# Patient Record
Sex: MALE | Race: WHITE | Employment: OTHER | URBAN - METROPOLITAN AREA
[De-identification: names, ages, dates, MRNs, and addresses within clinical notes are randomized per-mention and may not be internally consistent; named-entity substitution may affect disease eponyms.]

---

## 2020-08-31 ENCOUNTER — HOSPITAL ENCOUNTER (OUTPATIENT)
Age: 70
Discharge: HOME | End: 2020-09-02
Payer: COMMERCIAL

## 2020-08-31 PROCEDURE — U0003 INFECTIOUS AGENT DETECTION BY NUCLEIC ACID (DNA OR RNA); SEVERE ACUTE RESPIRATORY SYNDROME CORONAVIRUS 2 (SARS-COV-2) (CORONAVIRUS DISEASE [COVID-19]), AMPLIFIED PROBE TECHNIQUE, MAKING USE OF HIGH THROUGHPUT TECHNOLOGIES AS DESCRIBED BY CMS-2020-01-R: HCPCS

## 2020-09-01 LAB
SARS-COV-2: NOT DETECTED
SOURCE: NORMAL

## 2020-09-04 ENCOUNTER — HOSPITAL ENCOUNTER (OUTPATIENT)
Dept: PULMONOLOGY | Age: 70
Discharge: HOME | End: 2020-09-04
Payer: COMMERCIAL

## 2020-09-04 PROCEDURE — 94060 EVALUATION OF WHEEZING: CPT

## 2020-09-07 NOTE — PROCEDURES
[address] [address]                               PULMONARY FUNCTION    PATIENT NAME: Maximilian Elena                   :        1950  MED REC NO:   [id number]                            ROOM:  ACCOUNT NO:   [de-identified]                           ADMIT DATE: 2020  PROVIDER:     Sarah Root MD    DATE OF PROCEDURE:  2020    ORDERING PROVIDER:  Reyna Putnam MD.    DIAGNOSIS:  Shortness of breath. The patient is reported to smoke one  and a half pack a day for 52 years. FINDINGS:  As follows. FEV1 to FVC was 82. FEV1 was 2.85 liters, which  was 82% of predicted. FVC was 3.5 liters, which was 76% of predicted. IMPRESSION:  1. No obstruction. 2.  Probable mild restrictive pattern. RECOMMENDATIONS:  Recommend clinical correlation. I would recommend to  obtain also complete pulmonary function test including total lung  capacity and diffusion capacity.         Lillie Escobar MD    D: 2020 15:30:07       T: 2020 22:50:34     [id number]  Job#: [id number]     Doc#: [id number]    CC:

## 2020-12-03 DIAGNOSIS — R97.20 ELEVATED PSA: ICD-10-CM

## 2020-12-03 DIAGNOSIS — E78.00 PURE HYPERCHOLESTEROLEMIA: ICD-10-CM

## 2020-12-03 DIAGNOSIS — E11.9 TYPE 2 DIABETES MELLITUS WITHOUT COMPLICATION, WITHOUT LONG-TERM CURRENT USE OF INSULIN (HCC): ICD-10-CM

## 2020-12-03 DIAGNOSIS — E55.9 VITAMIN D INSUFFICIENCY: ICD-10-CM

## 2020-12-03 LAB
ALBUMIN SERPL-MCNC: 4.3 G/DL (ref 3.5–5.2)
ALP BLD-CCNC: 73 U/L (ref 40–129)
ALT SERPL-CCNC: 18 U/L (ref 0–40)
ANION GAP SERPL CALCULATED.3IONS-SCNC: 14 MMOL/L (ref 7–16)
AST SERPL-CCNC: 16 U/L (ref 0–39)
BILIRUB SERPL-MCNC: 0.6 MG/DL (ref 0–1.2)
BUN BLDV-MCNC: 21 MG/DL (ref 8–23)
CALCIUM SERPL-MCNC: 9.7 MG/DL (ref 8.6–10.2)
CHLORIDE BLD-SCNC: 100 MMOL/L (ref 98–107)
CHOLESTEROL, TOTAL: 129 MG/DL (ref 0–199)
CO2: 24 MMOL/L (ref 22–29)
CREAT SERPL-MCNC: 0.8 MG/DL (ref 0.7–1.2)
GFR AFRICAN AMERICAN: >60
GFR NON-AFRICAN AMERICAN: >60 ML/MIN/1.73
GLUCOSE BLD-MCNC: 149 MG/DL (ref 74–99)
HBA1C MFR BLD: 6.6 % (ref 4–5.6)
HCT VFR BLD CALC: 45.3 % (ref 37–54)
HDLC SERPL-MCNC: 42 MG/DL
HEMOGLOBIN: 15 G/DL (ref 12.5–16.5)
LDL CHOLESTEROL CALCULATED: 73 MG/DL (ref 0–99)
MCH RBC QN AUTO: 30.6 PG (ref 26–35)
MCHC RBC AUTO-ENTMCNC: 33.1 % (ref 32–34.5)
MCV RBC AUTO: 92.4 FL (ref 80–99.9)
PDW BLD-RTO: 13.2 FL (ref 11.5–15)
PLATELET # BLD: 290 E9/L (ref 130–450)
PMV BLD AUTO: 10 FL (ref 7–12)
POTASSIUM SERPL-SCNC: 4.5 MMOL/L (ref 3.5–5)
PROSTATE SPECIFIC ANTIGEN: 7.7 NG/ML (ref 0–4)
RBC # BLD: 4.9 E12/L (ref 3.8–5.8)
SODIUM BLD-SCNC: 138 MMOL/L (ref 132–146)
TOTAL PROTEIN: 7.3 G/DL (ref 6.4–8.3)
TRIGL SERPL-MCNC: 72 MG/DL (ref 0–149)
VITAMIN D 25-HYDROXY: 21 NG/ML (ref 30–100)
VLDLC SERPL CALC-MCNC: 14 MG/DL
WBC # BLD: 12.6 E9/L (ref 4.5–11.5)

## 2020-12-23 DIAGNOSIS — Z20.828 EXPOSURE TO SARS-ASSOCIATED CORONAVIRUS: ICD-10-CM

## 2020-12-25 LAB
SARS-COV-2: NOT DETECTED
SOURCE: NORMAL

## 2020-12-28 PROBLEM — M54.50 CHRONIC MIDLINE LOW BACK PAIN WITHOUT SCIATICA: Status: ACTIVE | Noted: 2020-12-28

## 2020-12-28 PROBLEM — G89.29 CHRONIC MIDLINE LOW BACK PAIN WITHOUT SCIATICA: Status: ACTIVE | Noted: 2020-12-28

## 2020-12-30 DIAGNOSIS — Z20.822 EXPOSURE TO COVID-19 VIRUS: ICD-10-CM

## 2021-01-01 ENCOUNTER — TELEPHONE (OUTPATIENT)
Dept: CASE MANAGEMENT | Age: 71
End: 2021-01-01

## 2021-01-01 ENCOUNTER — HOSPITAL ENCOUNTER (OUTPATIENT)
Age: 71
Discharge: HOME | End: 2021-05-01
Payer: MEDICARE

## 2021-01-01 ENCOUNTER — HOSPITAL ENCOUNTER (OUTPATIENT)
Dept: CT IMAGING | Age: 71
Discharge: HOME | End: 2021-10-27
Payer: MEDICARE

## 2021-01-01 ENCOUNTER — HOSPITAL ENCOUNTER (OUTPATIENT)
Age: 71
Setting detail: OUTPATIENT SURGERY
Discharge: HOME | End: 2021-05-04
Attending: UROLOGY | Admitting: UROLOGY
Payer: MEDICARE

## 2021-01-01 ENCOUNTER — ANESTHESIA EVENT (OUTPATIENT)
Dept: OPERATING ROOM | Age: 71
End: 2021-01-01
Payer: MEDICARE

## 2021-01-01 ENCOUNTER — ANESTHESIA (OUTPATIENT)
Dept: OPERATING ROOM | Age: 71
End: 2021-01-01
Payer: MEDICARE

## 2021-01-01 ENCOUNTER — PREP FOR PROCEDURE (OUTPATIENT)
Dept: UROLOGY | Age: 71
End: 2021-01-01

## 2021-01-01 VITALS — DIASTOLIC BLOOD PRESSURE: 59 MMHG | OXYGEN SATURATION: 93 % | SYSTOLIC BLOOD PRESSURE: 102 MMHG

## 2021-01-01 VITALS
BODY MASS INDEX: 26.95 KG/M2 | RESPIRATION RATE: 17 BRPM | OXYGEN SATURATION: 96 % | HEIGHT: 72 IN | HEART RATE: 72 BPM | TEMPERATURE: 96.8 F | SYSTOLIC BLOOD PRESSURE: 133 MMHG | DIASTOLIC BLOOD PRESSURE: 62 MMHG | WEIGHT: 199 LBS

## 2021-01-01 DIAGNOSIS — M54.50 CHRONIC MIDLINE LOW BACK PAIN WITHOUT SCIATICA: ICD-10-CM

## 2021-01-01 DIAGNOSIS — Z01.818 PREOP TESTING: ICD-10-CM

## 2021-01-01 DIAGNOSIS — E11.9 TYPE 2 DIABETES MELLITUS WITHOUT COMPLICATION, WITHOUT LONG-TERM CURRENT USE OF INSULIN (HCC): ICD-10-CM

## 2021-01-01 DIAGNOSIS — R97.20 ELEVATED PSA: ICD-10-CM

## 2021-01-01 DIAGNOSIS — Z01.818 PREOP TESTING: Primary | ICD-10-CM

## 2021-01-01 DIAGNOSIS — J43.8 OTHER EMPHYSEMA (HCC): ICD-10-CM

## 2021-01-01 DIAGNOSIS — E78.2 MIXED HYPERLIPIDEMIA: ICD-10-CM

## 2021-01-01 DIAGNOSIS — E78.00 PURE HYPERCHOLESTEROLEMIA: ICD-10-CM

## 2021-01-01 DIAGNOSIS — E55.9 VITAMIN D INSUFFICIENCY: ICD-10-CM

## 2021-01-01 DIAGNOSIS — G89.29 CHRONIC MIDLINE LOW BACK PAIN WITHOUT SCIATICA: ICD-10-CM

## 2021-01-01 LAB
ALBUMIN SERPL-MCNC: 4.2 G/DL (ref 3.5–5.2)
ALBUMIN SERPL-MCNC: 4.4 G/DL (ref 3.5–5.2)
ALP BLD-CCNC: 65 U/L (ref 40–129)
ALP BLD-CCNC: 78 U/L (ref 40–129)
ALT SERPL-CCNC: 13 U/L (ref 0–40)
ALT SERPL-CCNC: 15 U/L (ref 0–40)
AMPHETAMINE SCREEN, URINE: NOT DETECTED
ANION GAP SERPL CALCULATED.3IONS-SCNC: 15 MMOL/L (ref 7–16)
ANION GAP SERPL CALCULATED.3IONS-SCNC: 19 MMOL/L (ref 7–16)
AST SERPL-CCNC: 15 U/L (ref 0–39)
AST SERPL-CCNC: 18 U/L (ref 0–39)
BARBITURATE SCREEN URINE: NOT DETECTED
BENZODIAZEPINE SCREEN, URINE: NOT DETECTED
BILIRUB SERPL-MCNC: 0.7 MG/DL (ref 0–1.2)
BILIRUB SERPL-MCNC: 0.7 MG/DL (ref 0–1.2)
BILIRUBIN URINE: NEGATIVE
BLOOD, URINE: NEGATIVE
BUN BLDV-MCNC: 13 MG/DL (ref 6–23)
BUN BLDV-MCNC: 18 MG/DL (ref 6–23)
CALCIUM SERPL-MCNC: 9.6 MG/DL (ref 8.6–10.2)
CALCIUM SERPL-MCNC: 9.7 MG/DL (ref 8.6–10.2)
CANNABINOID SCREEN URINE: NOT DETECTED
CHLORIDE BLD-SCNC: 96 MMOL/L (ref 98–107)
CHLORIDE BLD-SCNC: 99 MMOL/L (ref 98–107)
CHOLESTEROL, TOTAL: 145 MG/DL (ref 0–199)
CHOLESTEROL, TOTAL: 217 MG/DL (ref 0–199)
CLARITY: CLEAR
CO2: 25 MMOL/L (ref 22–29)
CO2: 25 MMOL/L (ref 22–29)
COCAINE METABOLITE SCREEN URINE: NOT DETECTED
COLOR: YELLOW
CREAT SERPL-MCNC: 0.7 MG/DL (ref 0.7–1.2)
CREAT SERPL-MCNC: 0.9 MG/DL (ref 0.7–1.2)
CREATININE URINE: 110 MG/DL (ref 40–278)
FENTANYL SCREEN, URINE: NOT DETECTED
GFR AFRICAN AMERICAN: >60
GFR AFRICAN AMERICAN: >60
GFR NON-AFRICAN AMERICAN: >60 ML/MIN/1.73
GFR NON-AFRICAN AMERICAN: >60 ML/MIN/1.73
GLUCOSE BLD-MCNC: 119 MG/DL (ref 74–99)
GLUCOSE BLD-MCNC: 264 MG/DL (ref 74–99)
GLUCOSE URINE: NEGATIVE MG/DL
HBA1C MFR BLD: 6.3 % (ref 4–5.6)
HBA1C MFR BLD: 7.6 % (ref 4–5.6)
HCT VFR BLD CALC: 44 % (ref 37–54)
HDLC SERPL-MCNC: 52 MG/DL
HDLC SERPL-MCNC: 64 MG/DL
HEMOGLOBIN: 14.6 G/DL (ref 12.5–16.5)
KETONES, URINE: NEGATIVE MG/DL
LDL CHOLESTEROL CALCULATED: 134 MG/DL (ref 0–99)
LDL CHOLESTEROL CALCULATED: 74 MG/DL (ref 0–99)
LEUKOCYTE ESTERASE, URINE: NEGATIVE
Lab: ABNORMAL
MCH RBC QN AUTO: 31.8 PG (ref 26–35)
MCHC RBC AUTO-ENTMCNC: 33.2 % (ref 32–34.5)
MCV RBC AUTO: 95.9 FL (ref 80–99.9)
METER GLUCOSE: 132 MG/DL (ref 74–99)
METHADONE SCREEN, URINE: NOT DETECTED
MICROALBUMIN UR-MCNC: <12 MG/L
MICROALBUMIN/CREAT UR-RTO: ABNORMAL (ref 0–30)
NITRITE, URINE: NEGATIVE
OPIATE SCREEN URINE: POSITIVE
OXYCODONE URINE: NOT DETECTED
PDW BLD-RTO: 13.1 FL (ref 11.5–15)
PH UA: 7 (ref 5–9)
PHENCYCLIDINE SCREEN URINE: NOT DETECTED
PLATELET # BLD: 238 E9/L (ref 130–450)
PMV BLD AUTO: 9.4 FL (ref 7–12)
POTASSIUM SERPL-SCNC: 4.6 MMOL/L (ref 3.5–5)
POTASSIUM SERPL-SCNC: 4.6 MMOL/L (ref 3.5–5)
PROSTATE SPECIFIC ANTIGEN FREE: <0.1 NG/ML
PROSTATE SPECIFIC ANTIGEN PERCENT FREE: NORMAL %
PROSTATE SPECIFIC ANTIGEN: 0.08 NG/ML (ref 0–4)
PROSTATE SPECIFIC ANTIGEN: <0.1 NG/ML (ref 0–4)
PROTEIN UA: NEGATIVE MG/DL
RBC # BLD: 4.59 E12/L (ref 3.8–5.8)
SARS-COV-2: NOT DETECTED
SARS-COV-2: NOT DETECTED
SODIUM BLD-SCNC: 136 MMOL/L (ref 132–146)
SODIUM BLD-SCNC: 143 MMOL/L (ref 132–146)
SOURCE: NORMAL
SOURCE: NORMAL
SPECIFIC GRAVITY UA: 1.02 (ref 1–1.03)
TESTOSTERONE TOTAL: <2.5 NG/DL
TOTAL PROTEIN: 7 G/DL (ref 6.4–8.3)
TOTAL PROTEIN: 7.1 G/DL (ref 6.4–8.3)
TRIGL SERPL-MCNC: 94 MG/DL (ref 0–149)
TRIGL SERPL-MCNC: 97 MG/DL (ref 0–149)
UROBILINOGEN, URINE: 1 E.U./DL
VITAMIN D 25-HYDROXY: 26 NG/ML (ref 30–100)
VLDLC SERPL CALC-MCNC: 19 MG/DL
VLDLC SERPL CALC-MCNC: 19 MG/DL
WBC # BLD: 8.8 E9/L (ref 4.5–11.5)

## 2021-01-01 PROCEDURE — 2580000003 HC RX 258: Performed by: NURSE ANESTHETIST, CERTIFIED REGISTERED

## 2021-01-01 PROCEDURE — 7100000010 HC PHASE II RECOVERY - FIRST 15 MIN: Performed by: UROLOGY

## 2021-01-01 PROCEDURE — 7100000011 HC PHASE II RECOVERY - ADDTL 15 MIN: Performed by: UROLOGY

## 2021-01-01 PROCEDURE — U0003 INFECTIOUS AGENT DETECTION BY NUCLEIC ACID (DNA OR RNA); SEVERE ACUTE RESPIRATORY SYNDROME CORONAVIRUS 2 (SARS-COV-2) (CORONAVIRUS DISEASE [COVID-19]), AMPLIFIED PROBE TECHNIQUE, MAKING USE OF HIGH THROUGHPUT TECHNOLOGIES AS DESCRIBED BY CMS-2020-01-R: HCPCS

## 2021-01-01 PROCEDURE — 6360000002 HC RX W HCPCS: Performed by: NURSE PRACTITIONER

## 2021-01-01 PROCEDURE — 2780000010 HC IMPLANT OTHER: Performed by: UROLOGY

## 2021-01-01 PROCEDURE — 3600000012 HC SURGERY LEVEL 2 ADDTL 15MIN: Performed by: UROLOGY

## 2021-01-01 PROCEDURE — C1889 IMPLANT/INSERT DEVICE, NOC: HCPCS | Performed by: UROLOGY

## 2021-01-01 PROCEDURE — 71271 CT THORAX LUNG CANCER SCR C-: CPT

## 2021-01-01 PROCEDURE — 82962 GLUCOSE BLOOD TEST: CPT

## 2021-01-01 PROCEDURE — 3600000002 HC SURGERY LEVEL 2 BASE: Performed by: UROLOGY

## 2021-01-01 PROCEDURE — 3700000001 HC ADD 15 MINUTES (ANESTHESIA): Performed by: UROLOGY

## 2021-01-01 PROCEDURE — 3700000000 HC ANESTHESIA ATTENDED CARE: Performed by: UROLOGY

## 2021-01-01 PROCEDURE — 2500000003 HC RX 250 WO HCPCS: Performed by: UROLOGY

## 2021-01-01 PROCEDURE — 2709999900 HC NON-CHARGEABLE SUPPLY: Performed by: UROLOGY

## 2021-01-01 PROCEDURE — 6360000002 HC RX W HCPCS: Performed by: NURSE ANESTHETIST, CERTIFIED REGISTERED

## 2021-01-01 DEVICE — SPACEOAR SYSTEMS
Type: IMPLANTABLE DEVICE | Status: FUNCTIONAL
Brand: SPACEOAR VUE™ SYSTEM - 10ML

## 2021-01-01 RX ORDER — ALBUTEROL SULFATE 90 UG/1
2 AEROSOL, METERED RESPIRATORY (INHALATION) EVERY 6 HOURS PRN
COMMUNITY

## 2021-01-01 RX ORDER — HYDROCODONE BITARTRATE AND ACETAMINOPHEN 5; 325 MG/1; MG/1
2 TABLET ORAL PRN
Status: DISCONTINUED | OUTPATIENT
Start: 2021-01-01 | End: 2021-01-01 | Stop reason: HOSPADM

## 2021-01-01 RX ORDER — SODIUM CHLORIDE 9 MG/ML
25 INJECTION, SOLUTION INTRAVENOUS PRN
Status: CANCELLED | OUTPATIENT
Start: 2021-01-01

## 2021-01-01 RX ORDER — SODIUM CHLORIDE 0.9 % (FLUSH) 0.9 %
10 SYRINGE (ML) INJECTION PRN
Status: DISCONTINUED | OUTPATIENT
Start: 2021-01-01 | End: 2021-01-01 | Stop reason: HOSPADM

## 2021-01-01 RX ORDER — HYDROCODONE BITARTRATE AND ACETAMINOPHEN 5; 325 MG/1; MG/1
1 TABLET ORAL PRN
Status: DISCONTINUED | OUTPATIENT
Start: 2021-01-01 | End: 2021-01-01 | Stop reason: HOSPADM

## 2021-01-01 RX ORDER — SODIUM CHLORIDE 0.9 % (FLUSH) 0.9 %
10 SYRINGE (ML) INJECTION PRN
Status: CANCELLED | OUTPATIENT
Start: 2021-01-01

## 2021-01-01 RX ORDER — FENTANYL CITRATE 50 UG/ML
INJECTION, SOLUTION INTRAMUSCULAR; INTRAVENOUS PRN
Status: DISCONTINUED | OUTPATIENT
Start: 2021-01-01 | End: 2021-01-01 | Stop reason: SDUPTHER

## 2021-01-01 RX ORDER — ONDANSETRON 2 MG/ML
4 INJECTION INTRAMUSCULAR; INTRAVENOUS EVERY 6 HOURS PRN
Status: DISCONTINUED | OUTPATIENT
Start: 2021-01-01 | End: 2021-01-01 | Stop reason: HOSPADM

## 2021-01-01 RX ORDER — PROPOFOL 10 MG/ML
INJECTION, EMULSION INTRAVENOUS CONTINUOUS PRN
Status: DISCONTINUED | OUTPATIENT
Start: 2021-01-01 | End: 2021-01-01 | Stop reason: SDUPTHER

## 2021-01-01 RX ORDER — SODIUM CHLORIDE 9 MG/ML
INJECTION, SOLUTION INTRAVENOUS CONTINUOUS PRN
Status: DISCONTINUED | OUTPATIENT
Start: 2021-01-01 | End: 2021-01-01 | Stop reason: SDUPTHER

## 2021-01-01 RX ORDER — SODIUM CHLORIDE 0.9 % (FLUSH) 0.9 %
10 SYRINGE (ML) INJECTION EVERY 12 HOURS SCHEDULED
Status: CANCELLED | OUTPATIENT
Start: 2021-01-01

## 2021-01-01 RX ORDER — SODIUM CHLORIDE 9 MG/ML
INJECTION, SOLUTION INTRAVENOUS CONTINUOUS
Status: CANCELLED | OUTPATIENT
Start: 2021-01-01

## 2021-01-01 RX ORDER — SODIUM CHLORIDE 9 MG/ML
INJECTION, SOLUTION INTRAVENOUS CONTINUOUS
Status: DISCONTINUED | OUTPATIENT
Start: 2021-01-01 | End: 2021-01-01 | Stop reason: HOSPADM

## 2021-01-01 RX ORDER — SODIUM CHLORIDE 9 MG/ML
25 INJECTION, SOLUTION INTRAVENOUS PRN
Status: DISCONTINUED | OUTPATIENT
Start: 2021-01-01 | End: 2021-01-01 | Stop reason: HOSPADM

## 2021-01-01 RX ORDER — IBUPROFEN 800 MG/1
800 TABLET ORAL EVERY 8 HOURS PRN
Qty: 12 TABLET | Refills: 3 | Status: SHIPPED | OUTPATIENT
Start: 2021-01-01

## 2021-01-01 RX ORDER — IBUPROFEN 800 MG/1
800 TABLET ORAL EVERY 8 HOURS PRN
Status: DISCONTINUED | OUTPATIENT
Start: 2021-01-01 | End: 2021-01-01 | Stop reason: HOSPADM

## 2021-01-01 RX ORDER — SODIUM CHLORIDE 0.9 % (FLUSH) 0.9 %
10 SYRINGE (ML) INJECTION EVERY 12 HOURS SCHEDULED
Status: DISCONTINUED | OUTPATIENT
Start: 2021-01-01 | End: 2021-01-01 | Stop reason: HOSPADM

## 2021-01-01 RX ORDER — BUPIVACAINE HYDROCHLORIDE 5 MG/ML
INJECTION, SOLUTION EPIDURAL; INTRACAUDAL PRN
Status: DISCONTINUED | OUTPATIENT
Start: 2021-01-01 | End: 2021-01-01 | Stop reason: ALTCHOICE

## 2021-01-01 RX ADMIN — PROPOFOL 100 MCG/KG/MIN: 10 INJECTION, EMULSION INTRAVENOUS at 13:55

## 2021-01-01 RX ADMIN — SODIUM CHLORIDE: 9 INJECTION, SOLUTION INTRAVENOUS at 13:52

## 2021-01-01 RX ADMIN — Medication 2 G: at 13:52

## 2021-01-01 RX ADMIN — FENTANYL CITRATE 100 MCG: 50 INJECTION, SOLUTION INTRAMUSCULAR; INTRAVENOUS at 13:55

## 2021-01-01 ASSESSMENT — PULMONARY FUNCTION TESTS
PIF_VALUE: 0

## 2021-01-01 ASSESSMENT — COPD QUESTIONNAIRES: CAT_SEVERITY: SEVERE

## 2021-01-01 ASSESSMENT — ENCOUNTER SYMPTOMS: SHORTNESS OF BREATH: 1

## 2021-01-01 ASSESSMENT — PAIN SCALES - GENERAL: PAINLEVEL_OUTOF10: 0

## 2021-03-08 PROBLEM — E78.2 MIXED HYPERLIPIDEMIA: Status: ACTIVE | Noted: 2021-01-01

## 2021-03-08 PROBLEM — E11.9 TYPE 2 DIABETES MELLITUS WITHOUT COMPLICATION, WITHOUT LONG-TERM CURRENT USE OF INSULIN (HCC): Status: ACTIVE | Noted: 2021-01-01

## 2021-03-08 PROBLEM — C61 PROSTATE CANCER (HCC): Status: ACTIVE | Noted: 2021-01-01

## 2021-04-29 NOTE — PROGRESS NOTES
Have you been tested for COVID  Yes           Have you been told you were positive for COVID No  Have you had any known exposure to someone that is positive for COVID No  Do you have a cough                   No              Do you have shortness of breath No                 Do you have a sore throat            No                Are you having chills                    No                Are you having muscle aches. No                    Please come to the hospital wearing a mask and have your significant other wear a mask as well. Both of you should check your temperature before leaving to come here,  if it is 100 or higher please call [phone] for instruction.

## 2021-05-04 NOTE — H&P
PSA was 4.4 then 4.9 now over 7   ERUM firm prostate. Pt retired. No family history   Dudley have BPH with nocturia and slow stream.   Flomax is helping some with BPH   Volume 31.9   Does have significant COPD and gets short of breath walking through office. No abdominal surgeries. Bx 8/12 cores positive. Right mid and base 4+4=8, LM 4+4=8, 2 cores of 4+3=7 1 core 3+4=7. PSA 7.70     PLAN:   I discussed the diagnosis with Esha Rodriguez today. He does have significant COPD and is hesitant about robotic prostatectomy. He is interested in radiation. Needs CT and Bone scan for complete staging. Discussed NCCN guidelines. If he needs radiation would need space oar along with 2 years of androgen deprivation. Continue FLOMAX for BPH as it is helping some. Follow up in 2 weeks with staging and decision. Will work on [employer] for ADT.      3-   PT HERE FOR F/U AFTER IMAGING   3-9-2021 CT ABD/PELVIS REVEALED NO ACTIVE PROCESS   3-9-2021 WHOLE BODY SCAN REVEALED UNREMARKABLE WITH NO EVIDENCE OF METS   TRUS BX ON 2-8-2021 WITH NEETA OF 4+4=8   3-2-2021 PT HAD 6 MONTH ELIGARD INJECTION GIVEN   + LOWER BACK PAIN- ONGOING CHRONIC ISSUE   PT DENIES ANY URINARY ISSUES AT PRESENT        ALLERGIES: Sulfa       MEDICATIONS: Flomax 0.4 mg capsule 1 capsule PO Daily   Albuterol Sulfate Hfa 1 PO Daily   Glipizide   Hydrocodone-Acetaminophen   Symbicort         PSH: LHRH Injection 1 3 4 Month - 3/2/2021  Prostate Needle Biopsy - 2/8/2021           PSH Notes: SPINE SX 3 YEARS AGO   ABLATION ON BACK     NON- PSH: Tonsillectomy          PMH: Malignant neoplasm of prostate - 3/2/2021, - 2/18/2021  Benign prostatic hyperplasia with lower urinary tract symptoms - 2/4/2021  Elevated prostate specific antigen [PSA] - 2/4/2021  Frequency of micturition - 2/4/2021  Nocturia - 2/4/2021  Urgency of urination - 2/4/2021       NON- PMH: Emphysema, unspecified  Type 2 diabetes mellitus without complications       FAMILY HISTORY: High Cholesterol - Runs in Family     SOCIAL HISTORY: Marital Status:   Preferred Language: English; Race: White  Current Smoking Status: Patient does not smoke anymore. Has not smoked since 2/1/2020. Tobacco Use Assessment Completed: Used Tobacco in last 30 days? Social Drinker. Does not use drugs. Drinks 1 caffeinated drink per day. Has not had a blood transfusion. REVIEW OF SYSTEMS:     Constitutional:   Patient denies fever, chills, and weight loss. Eyes:   Patient reports wearing glasses. Patient denies cataract(s) and dry eyes. Ears, Nose, Mouth, Throat:   Patient denies hearing loss and dry mouth. Cardiovascular:   Patient denies chest pains, swollen ankles, and irregular heartbeat. Respiratory:   Patient denies shortness of breath, wheezing, and chronic cough. Gastrointestinal:   Patient denies abdominal pain, nausea/vomiting, and change in bowels. Genitourinary:   Patient denies incontinence, painful urination, blood in urine, ford cath in place, and s/p cath in place. Musculoskeletal:   Patient denies using cane, using walker, and using wheelchair. Integumentary/Skin:   Patient denies rash, persistent itching, and skin cancer history. Neurological:   Patient denies numbness, tingling, and dizziness. Hematologic/Lymphatic:   Patient denies swollen glands, abnormal bleeding, and history blood transfusion. Notes: WEARING A MASK       VITAL SIGNS:         Weight 195 lb / 88.45 kg   Height 71 in / 180.34 cm   Temperature 97.7 F / 36.5 C   BMI 27.2 kg/m²     MULTI-SYSTEM PHYSICAL EXAMINATION:     Constitutional: Well-nourished. No physical deformities. Normally developed. Good grooming. Neck: Neck symmetrical, not swollen. Normal tracheal position. Respiratory: No labored breathing, no use of accessory muscles. Cardiovascular: Normal temperature, normal extremity pulses, no swelling, no varicosities. Lymphatic: No enlargement of neck, axillae, groin. Skin: No paleness, no jaundice, no cyanosis. No lesion, no ulcer, no rash. Neurologic / Psychiatric: Oriented to time, oriented to place, oriented to person. No depression, no anxiety, no agitation. Gastrointestinal: No mass, no tenderness, no rigidity, non obese abdomen. Eyes: Normal conjunctivae. Normal eyelids. Ears, Nose, Mouth, and Throat: Left ear no scars, no lesions, no masses. Right ear no scars, no lesions, no masses. Nose no scars, no lesions, no masses. Normal hearing. Wearing a mask   Musculoskeletal: Normal gait and station of head and neck. PAST DATA REVIEWED:   Source Of History:  Patient   Lab Test Review:   PSA   Records Review:   Pathology Reports, Previous Patient Records   Urine Test Review:   Urinalysis   X-Ray Review: C.T. Abdomen/Pelvis: Reviewed Films. Reviewed Report. Discussed With Patient. Bone Scan: Reviewed Films. Reviewed Report. Discussed With Patient. 12/03/20 02/17/20 11/12/19   PSA   Total PSA 7.70  4.920  4.430        PROCEDURES: None     ASSESSMENT:       ICD-10 Details   1 :   Malignant neoplasm of prostate - C61    2   Elevated prostate specific antigen [PSA] - R97.20    3   Nocturia - R35.1    4   Urgency of urination - R39.15      PLAN:   PCA           Notes:   Pt here with Prostate cancer dx. Was 4.4 then 4.9 now over 7   ERUM firm prostate. Pt retired. No family history   Dudley have BPH with nocturia and slow stream.   Flomax is helping some with BPH   Volume 31.9   Does have significant COPD and gets short of breath walking through office. No abdominal surgeries. Bx 8/12 cores positive. Right mid and base 4+4=8, LM 4+4=8, 2 cores of 4+3=7 1 core 3+4=7. PSA 7.70   Bone scan Negative. CT negative. Eligard 6 months 1/4 on 3/2/21   Next eligard on 9/2/21   Pt has decided on radiation. PLAN:     Send referal to [facility]. for external beam radiation. Needs space oar for radiation.    Will continue androgen deprovation for 2 years per NCCN guidelines. Will follow up after radiation.

## 2021-05-04 NOTE — ANESTHESIA POSTPROCEDURE EVALUATION
Department of Anesthesiology  Postprocedure Note    Patient: Ana Suggs  MRN: [id number]  YOB: 1950  Date of evaluation: 5/4/2021  Time:  4:11 PM     Procedure Summary     Date: 05/04/21 Room / Location: [facility] / [address] [place] [address]    Anesthesia Start: 1352 Anesthesia Stop: 1418    Procedure: ULTRASOUND GUIDED TRANSPERINEAL IMPLANT OF SPACEOAR (N/A ) Diagnosis: (PROSTATE CA)    Surgeons: Siddhartha Henry MD Responsible Provider: lEder Oneil MD    Anesthesia Type: MAC ASA Status: 4          Anesthesia Type: MAC    Marichuy Phase I: Marichuy Score: 10    Marichuy Phase II: Marichuy Score: 10    Last vitals: Reviewed and per EMR flowsheets.        Anesthesia Post Evaluation    Patient location during evaluation: PACU  Patient participation: complete - patient participated  Level of consciousness: awake and alert  Airway patency: patent  Nausea & Vomiting: no nausea and no vomiting  Complications: no  Cardiovascular status: hemodynamically stable  Respiratory status: acceptable  Hydration status: euvolemic

## 2021-05-04 NOTE — OP NOTE
[address] [place] [address].  Urology Post-operative Note      Jet Luu  YOB: 1950  [id number]      Pre-operative Diagnosis: Localized prostate cancer     Post-operative Diagnosis:  Same     Procedure: Transperineal ultrasound-guided placement of periprostatic SpaceOAR ELICEO hydrogel, intraoperative ultrasonic interpretation, rectal exam     Surgeon: Drea Campa MD, FACS     Anesthesia:   [facility]     Estimated Blood Loss:   Minimal     Complications: None     Drains: None     Specimen(s): None     Clinical Note:   73-year-old male recently diagnosed with clinically localized prostate cancer. He has met with radiation oncology and is to start prostatic radiation therapy in the very near future. The risks and benefits of the procedure including but not limited to infection, bleeding, rectal or bladder injury, rectal pressure etc. were discussed in detail and he elected to proceed     Operative Description:   He was taken to the operating room and placed on the OR table in supine position. He received IV Ancef preoperatively. Following induction of anesthesia he was repositioned into the dorsolithotomy position. His scrotum was draped out of the operative field. Iodine paint was placed around the perineum and rectum. The stepper and a BK ultrasound probe were inserted rectally and fixed into place. Imaging of the prostate was performed in the transverse and longitudinal planes. This identified the periprostatic fat plane between the prostate and the rectum. About 5 cc of Marcaine was injected into the perineum. A 10 cm beveled 18-gauge needle was inserted transperineally with the bevel facing inferiorly. Ultrasound guidance allowed tracking of the needle placement into the periprosthetic fat plane near the base of the prostate gland. 12-15 cc's of saline was injected into this area for hydrodissection. The SpaceOAR ELICEO kit was then opened on the back table sterilely.   The solutions were mixed appropriately and connected to the same needle which remained in place. The solution was injected completely and there was excellent placement confirmed by ultrasound guidance into the periprosthetic fat plane without rectal injury or involvement. The needle was removed. There was minimal bleeding. The ultrasound probe was removed. Rectal exam confirmed adequate placement of the hydrogel in the periprosthetic plane without involvement of the rectum. There is no active bleeding. He tolerated the procedure well and was taken to the PACU in stable condition. He was discharged home with  a prescription for ibuprofen.   He will follow-up as instructed and begin radiation soon       Drea Campa MD  5/4/2021  2:23 PM

## 2021-05-04 NOTE — BRIEF OP NOTE
Brief Postoperative Note      Patient: Edwar Catherine  YOB: 1950  MRN: [id number]    Date of Procedure: 5/4/2021    Pre-Op Diagnosis: PROSTATE CA    Post-Op Diagnosis: Same       Procedure(s):  ULTRASOUND GUIDED TRANSPERINEAL IMPLANT OF SPACEOAR    Surgeon(s):  Donal Briseno MD    Assistant:  * No surgical staff found *    Anesthesia: Monitor Anesthesia Care    Estimated Blood Loss (mL): Minimal    Complications: None    Specimens:   * No specimens in log *    Implants:  Implant Name Type Inv.  Item Serial No.  Lot No. LRB No. Used Action   SPACER RAD THER SPACEOAR ELICEO  SPACER RAD THER SPACEOAR ELICEO  [employer]WD [id number] N/A 1 Implanted         Drains: * No LDAs found *    Findings: none    Electronically signed by Donal Briseno MD on 5/4/2021 at 2:19 PM

## 2021-05-04 NOTE — ANESTHESIA PRE PROCEDURE
Department of Anesthesiology  Preprocedure Note       Name:  Mamta Zazueta   Age:  80 y.o.  :  1950                                          MRN:  [id number]         Date:  2021      Surgeon: Good Quintanilla):  Sarah Salmon MD    Procedure: Procedure(s):  ULTRASOUND GUIDED TRANSPERINEAL IMPLANT OF SPACEOAR    Medications prior to admission:   Prior to Admission medications    Medication Sig Start Date End Date Taking? Authorizing Provider   Cholecalciferol (VITAMIN D3 PO) Take by mouth daily   Yes Historical Provider, MD   albuterol sulfate HFA (VENTOLIN HFA) 108 (90 Base) MCG/ACT inhaler Inhale 2 puffs into the lungs every 6 hours as needed for Shortness of Breath   Yes Historical Provider, MD   NONFORMULARY Uses CBD oil about 1x/week   Yes Historical Provider, MD   OXYGEN Inhale into the lungs At 2-3 liters prn   Yes Historical Provider, MD   HYDROcodone-acetaminophen (NORCO)  MG per tablet Take 1 tablet by mouth every 6 hours as needed for Pain for up to 30 days. Patient taking differently: Take 1 tablet by mouth every 6 hours.   21 Yes Va Florentino MD   SYMBICORT 160-4.5 MCG/ACT AERO inhale 2 puffs by mouth twice a day 21  Yes Historical Provider, MD   tamsulosin (FLOMAX) 0.4 MG capsule take 1 capsule by mouth once daily 3/1/21  Yes Historical Provider, MD   glipiZIDE (GLUCOTROL XL) 10 MG extended release tablet take 1 tablet by mouth twice a day 21  Yes Va Florentino MD   rosuvastatin (CRESTOR) 5 MG tablet take 1 tablet by mouth two times a week 9/10/20  Yes Va Florentino MD   Microlet Lancets MISC TEST twice a day 4/15/20   Va Florentino MD   CONTOUR NEXT TEST strip TEST twice a day 4/15/20   Va Florentino MD       Current medications:    Current Facility-Administered Medications   Medication Dose Route Frequency Provider Last Rate Last Admin    0.9 % sodium chloride infusion   Intravenous Continuous Rosalinda Wilson, APRN - CNP        0.9 % sodium chloride infusion  25 mL Intravenous PRN MANNIE Rios CNP        ceFAZolin (ANCEF) 2000 mg in sterile water 20 mL IV syringe  2,000 mg Intravenous On Call to [address], APRN - CNP        sodium chloride flush 0.9 % injection 10 mL  10 mL Intravenous 2 times per day MANNIE Rios - CNP        sodium chloride flush 0.9 % injection 10 mL  10 mL Intravenous PRN MANNIE Rios CNP        HYDROcodone-acetaminophen (NORCO) 5-325 MG per tablet 1 tablet  1 tablet Oral PRN Pablo Carter MD        Or    HYDROcodone-acetaminophen (NORCO) 5-325 MG per tablet 2 tablet  2 tablet Oral PRN Pablo Carter MD           Allergies:     Allergies   Allergen Reactions    Sulfa Antibiotics Itching, Other (See Comments) and Anaphylaxis     Throat closed       Problem List:    Patient Active Problem List   Diagnosis Code    Chronic midline low back pain without sciatica M54.5, G89.29    Mixed hyperlipidemia E78.2    Type 2 diabetes mellitus without complication, without long-term current use of insulin ([facility]) E11.9    Prostate cancer ([facility] C61       Past Medical History:        Diagnosis Date    Cancer [facility])     prostate, has had hormone injection    Chronic back pain     LOW BACK PAIN     COPD (chronic obstructive pulmonary disease) ([facility])     uses O2 prn / follows with Dr. Renee Conteh Diabetes mellitus ([facility]     Enlarged prostate without lower urinary tract symptoms (luts)     Full dentures     Hyperlipidemia     Male erectile disorder     Myalgia     LAURA (obstructive sleep apnea)     no tolerate cpap    SOB (shortness of breath)     dt COPD    Vitamin D deficiency        Past Surgical History:        Procedure Laterality Date    BACK SURGERY  2017    lumbar    CARPAL TUNNEL RELEASE Bilateral     TONSILLECTOMY  child       Social History:    Social History     Tobacco Use    Smoking status: Former Smoker     Packs/day: 0.75     Types: Cigarettes     Quit date: No results found for: PHART, PO2ART, FGW9JYC, TFN5OEI, BEART, P6TKVBGU     Type & Screen (If Applicable):  No results found for: LABABO, LABRH    Drug/Infectious Status (If Applicable):  No results found for: HIV, HEPCAB    COVID-19 Screening (If Applicable):   Lab Results   Component Value Date    COVID19 Not Detected 04/29/2021           Anesthesia Evaluation  Patient summary reviewed no history of anesthetic complications:   Airway: Mallampati: II  TM distance: >3 FB   Neck ROM: full   Dental:    (+) lower dentures and upper dentures      Pulmonary:   (+) COPD: severe,  shortness of breath:  sleep apnea:  decreased breath sounds,                            ROS comment: Former Smoker  Chronic hypoxic resp failure   Cardiovascular:    (+) hyperlipidemia        Rhythm: regular  Rate: normal           Beta Blocker:  Not on Beta Blocker         Neuro/Psych:   Negative Neuro/Psych ROS              GI/Hepatic/Renal: Neg GI/Hepatic/Renal ROS            Endo/Other:    (+) DiabetesType II DM, , malignancy/cancer (prostate). Abdominal:           Vascular: negative vascular ROS. Anesthesia Plan      MAC     ASA 4       Induction: intravenous. Anesthetic plan and risks discussed with patient. Plan discussed with CRNA.                   Shruthi Saenz MD   5/4/2021

## 2021-10-06 PROBLEM — R09.02 HYPOXIA: Status: ACTIVE | Noted: 2021-01-01

## 2021-10-22 NOTE — TELEPHONE ENCOUNTER
I called the patient and left a message reminding him of his CT lung screening at [facility] on 10/25/2021 at 7:00 am with an 6:30 am arrival.  If unable to keep this appt call the office at [phone] to get rescheduled.         Electronically signed by Taco Thrasher on 10/22/21 at 10:10 AM EDT

## 2021-11-11 PROBLEM — J41.0 SIMPLE CHRONIC BRONCHITIS (HCC): Status: ACTIVE | Noted: 2021-01-01

## 2022-01-01 DIAGNOSIS — Z79.899 MEDICATION MANAGEMENT: ICD-10-CM

## 2022-01-01 LAB
AMPHETAMINE SCREEN, URINE: NOT DETECTED
BARBITURATE SCREEN URINE: NOT DETECTED
BENZODIAZEPINE SCREEN, URINE: NOT DETECTED
CANNABINOID SCREEN URINE: NOT DETECTED
COCAINE METABOLITE SCREEN URINE: NOT DETECTED
FENTANYL SCREEN, URINE: NOT DETECTED
Lab: ABNORMAL
METHADONE SCREEN, URINE: NOT DETECTED
OPIATE SCREEN URINE: POSITIVE
OXYCODONE URINE: NOT DETECTED
PHENCYCLIDINE SCREEN URINE: NOT DETECTED

## (undated) DEVICE — STANDARD HYPODERMIC NEEDLE,POLYPROPYLENE HUB: Brand: MONOJECT

## (undated) DEVICE — KIT SURG PREP POVIDONE IOD PRESATURATED PAINT WET FOR UNIV

## (undated) DEVICE — GLOVE ORANGE PI 7 1/2   MSG9075

## (undated) DEVICE — TOWEL,OR,DSP,ST,BLUE,STD,6/PK,12PK/CS: Brand: MEDLINE

## (undated) DEVICE — DRAPE,REIN 53X77,STERILE: Brand: MEDLINE

## (undated) DEVICE — GOWN,SIRUS,FABRNF,XL,20/CS: Brand: MEDLINE

## (undated) DEVICE — GAUZE,SPONGE,4"X4",8PLY,STRL,LF,10/TRAY: Brand: MEDLINE

## (undated) DEVICE — SYRINGE, LUER LOCK, 10ML: Brand: MEDLINE

## (undated) DEVICE — GEL US 20GM NONIRRITATING OVERWRAPPED FILE PCH TRNSMIT

## (undated) DEVICE — COVER PRB L11.9CM TAPR L3.8X61CM TRNSPAR SFT PLIABLE

## (undated) DEVICE — SET SURG BASIN MAYO REUSABLE

## (undated) DEVICE — MARKER,SKIN,WI/RULER AND LABELS: Brand: MEDLINE

## (undated) DEVICE — CUP MEDICINE ST